# Patient Record
Sex: FEMALE
[De-identification: names, ages, dates, MRNs, and addresses within clinical notes are randomized per-mention and may not be internally consistent; named-entity substitution may affect disease eponyms.]

---

## 2023-09-11 PROBLEM — Z00.00 ENCOUNTER FOR PREVENTIVE HEALTH EXAMINATION: Status: ACTIVE | Noted: 2023-09-11

## 2023-10-04 RX ORDER — FAMOTIDINE 10 MG/1
TABLET, FILM COATED ORAL
Refills: 0 | Status: ACTIVE | COMMUNITY

## 2023-10-05 ENCOUNTER — APPOINTMENT (OUTPATIENT)
Dept: NEUROSURGERY | Facility: CLINIC | Age: 63
End: 2023-10-05
Payer: COMMERCIAL

## 2023-10-05 PROCEDURE — 99202 OFFICE O/P NEW SF 15 MIN: CPT | Mod: 95

## 2024-01-25 ENCOUNTER — APPOINTMENT (OUTPATIENT)
Dept: NEUROLOGY | Facility: CLINIC | Age: 64
End: 2024-01-25

## 2024-02-06 ENCOUNTER — APPOINTMENT (OUTPATIENT)
Dept: NEUROLOGY | Facility: CLINIC | Age: 64
End: 2024-02-06
Payer: COMMERCIAL

## 2024-02-06 DIAGNOSIS — G44.009 CLUSTER HEADACHE SYNDROME, UNSPECIFIED, NOT INTRACTABLE: ICD-10-CM

## 2024-02-06 PROCEDURE — 99205 OFFICE O/P NEW HI 60 MIN: CPT | Mod: 95

## 2024-02-06 NOTE — ASSESSMENT
[FreeTextEntry1] : Aleksandra Cedeño is a 63-year-old female with PMH of osteoporosis/osteoarthritis (on HRT-oral progesterone/transvaginal estrogen/testosterone), eczema (treated with topical creams) and GERD (on Famotidine) who presents for initial consultation for headaches. Differentials include cluster vs. tension type headaches, therefore further imaging is needed to rule out more sinister causes.   Plan: -MRI IAC w/wo to r/o structural abnormality -Start Magnesium Glycerinate 400 mg daily at bedtime to help with headache prevention -Do not use Tylenol more than 3-4 times per week to prevent rebound headaches -Start headache journal to identify headache frequency, track triggers and response to medications -Continue healthy lifestyle habits including regular exercise, hydration and 8 hours of sleep per night -RTO in 1 month once imaging has been completed

## 2024-02-06 NOTE — HISTORY OF PRESENT ILLNESS
[FreeTextEntry1] : Aleksandra Cedeño is a 63-year-old female with PMH of osteoporosis/osteoarthritis (on HRT-oral progesterone/transvaginal estrogen/testosterone), eczema (treated with topical creams) and GERD (on Famotidine) who presents for initial consultation for headaches.  Patient reports having headaches for the past 2 years. When they initially happened, she was taking Tylenol daily due to tension-like headaches. She also notes shooting head pains prior to her typical headaches that occur in all different parts of her head. Since seeing Dr. Briggs, her headaches did improve. She is working with a doctor to improve her posture to prevent her some leaning forward which has helped with her neck pain. She was also diagnosed with TMJ and had minimal adjustment with a chiropractor which improved her right sided jaw pain. However, she recently had oral surgery due to an abscessed root canal which caused her headaches to return and have been occurring every day. Her headaches occur with pressure/tightness around her temples, shooting pains all over her head and right eye pain occasionally. The right eye pain feels like "headache in her eye" but only occurs for a few seconds about once per week. The headaches worsen at night and sometimes wake her up from sleep. She also reports bilateral tinnitus, R>L with sensitivity to light/sound but unclear if it is related to her baseline eye issues. Denies any eye tearing, some more nasal congestion but facial numbness. She has been taking Tylenol a few times per week but only when the pain is very bothersome. She supposedly completed a MRI a few years ago but it was unable to be read appropriately. She has not noted any particular triggers such as foods or the weather. She reports sleeping improves her headaches. She has not tried any previous headache prevention medication. She reports a healthy lifestyle with walking as her main form of exercise. She is adopted so does not know if she has a family history of migraines.

## 2024-02-06 NOTE — REASON FOR VISIT
[Home] : at home, [unfilled] , at the time of the visit. [Medical Office: ([facility])___] : at the medical office located in  [Patient] : the patient [Self] : self [Initial Evaluation] : an initial evaluation

## 2024-03-18 ENCOUNTER — NON-APPOINTMENT (OUTPATIENT)
Age: 64
End: 2024-03-18

## 2024-06-10 ENCOUNTER — APPOINTMENT (OUTPATIENT)
Dept: NEUROLOGY | Facility: CLINIC | Age: 64
End: 2024-06-10